# Patient Record
Sex: MALE | Race: BLACK OR AFRICAN AMERICAN | NOT HISPANIC OR LATINO | Employment: UNEMPLOYED | URBAN - METROPOLITAN AREA
[De-identification: names, ages, dates, MRNs, and addresses within clinical notes are randomized per-mention and may not be internally consistent; named-entity substitution may affect disease eponyms.]

---

## 2023-03-09 ENCOUNTER — HOSPITAL ENCOUNTER (EMERGENCY)
Facility: HOSPITAL | Age: 63
Discharge: HOME | End: 2023-03-10
Attending: EMERGENCY MEDICINE
Payer: COMMERCIAL

## 2023-03-09 DIAGNOSIS — M25.552 LEFT HIP PAIN: Primary | ICD-10-CM

## 2023-03-09 DIAGNOSIS — C79.51 BONE METASTASES: ICD-10-CM

## 2023-03-09 LAB
ALBUMIN SERPL BCP-MCNC: 3.5 G/DL (ref 3.5–5.2)
ALP SERPL-CCNC: 591 U/L (ref 55–135)
ALT SERPL W/O P-5'-P-CCNC: 60 U/L (ref 10–44)
ANION GAP SERPL CALC-SCNC: 10 MMOL/L (ref 8–16)
AST SERPL-CCNC: 32 U/L (ref 10–40)
BASOPHILS # BLD AUTO: 0.02 K/UL (ref 0–0.2)
BASOPHILS NFR BLD: 0.4 % (ref 0–1.9)
BILIRUB SERPL-MCNC: 0.3 MG/DL (ref 0.1–1)
BUN SERPL-MCNC: 22 MG/DL (ref 8–23)
CALCIUM SERPL-MCNC: 9.3 MG/DL (ref 8.7–10.5)
CHLORIDE SERPL-SCNC: 105 MMOL/L (ref 95–110)
CO2 SERPL-SCNC: 25 MMOL/L (ref 23–29)
CREAT SERPL-MCNC: 1.2 MG/DL (ref 0.5–1.4)
DIFFERENTIAL METHOD: ABNORMAL
EOSINOPHIL # BLD AUTO: 0.1 K/UL (ref 0–0.5)
EOSINOPHIL NFR BLD: 1.6 % (ref 0–8)
ERYTHROCYTE [DISTWIDTH] IN BLOOD BY AUTOMATED COUNT: 12.6 % (ref 11.5–14.5)
EST. GFR  (NO RACE VARIABLE): >60 ML/MIN/1.73 M^2
GLUCOSE SERPL-MCNC: 94 MG/DL (ref 70–110)
HCT VFR BLD AUTO: 34.4 % (ref 40–54)
HGB BLD-MCNC: 11.7 G/DL (ref 14–18)
IMM GRANULOCYTES # BLD AUTO: 0.03 K/UL (ref 0–0.04)
IMM GRANULOCYTES NFR BLD AUTO: 0.6 % (ref 0–0.5)
LYMPHOCYTES # BLD AUTO: 2.5 K/UL (ref 1–4.8)
LYMPHOCYTES NFR BLD: 51.1 % (ref 18–48)
MCH RBC QN AUTO: 30.4 PG (ref 27–31)
MCHC RBC AUTO-ENTMCNC: 34 G/DL (ref 32–36)
MCV RBC AUTO: 89 FL (ref 82–98)
MONOCYTES # BLD AUTO: 0.4 K/UL (ref 0.3–1)
MONOCYTES NFR BLD: 8.9 % (ref 4–15)
NEUTROPHILS # BLD AUTO: 1.9 K/UL (ref 1.8–7.7)
NEUTROPHILS NFR BLD: 37.4 % (ref 38–73)
NRBC BLD-RTO: 0 /100 WBC
PLATELET # BLD AUTO: 185 K/UL (ref 150–450)
PMV BLD AUTO: 10.3 FL (ref 9.2–12.9)
POTASSIUM SERPL-SCNC: 3.9 MMOL/L (ref 3.5–5.1)
PROT SERPL-MCNC: 7.5 G/DL (ref 6–8.4)
RBC # BLD AUTO: 3.85 M/UL (ref 4.6–6.2)
SODIUM SERPL-SCNC: 140 MMOL/L (ref 136–145)
WBC # BLD AUTO: 4.97 K/UL (ref 3.9–12.7)

## 2023-03-09 PROCEDURE — 84153 ASSAY OF PSA TOTAL: CPT | Performed by: EMERGENCY MEDICINE

## 2023-03-09 PROCEDURE — 85025 COMPLETE CBC W/AUTO DIFF WBC: CPT | Performed by: EMERGENCY MEDICINE

## 2023-03-09 PROCEDURE — 80053 COMPREHEN METABOLIC PANEL: CPT | Performed by: EMERGENCY MEDICINE

## 2023-03-09 PROCEDURE — 99285 EMERGENCY DEPT VISIT HI MDM: CPT

## 2023-03-09 RX ORDER — OXYCODONE HYDROCHLORIDE 5 MG/1
5 TABLET ORAL
Status: DISCONTINUED | OUTPATIENT
Start: 2023-03-09 | End: 2023-03-10

## 2023-03-10 VITALS
HEIGHT: 72 IN | HEART RATE: 60 BPM | OXYGEN SATURATION: 99 % | DIASTOLIC BLOOD PRESSURE: 57 MMHG | TEMPERATURE: 97 F | SYSTOLIC BLOOD PRESSURE: 102 MMHG | BODY MASS INDEX: 19.23 KG/M2 | RESPIRATION RATE: 20 BRPM | WEIGHT: 142 LBS

## 2023-03-10 LAB
PROSTATE SPECIFIC ANTIGEN, TOTAL: 580.8 NG/ML (ref 0–4)
PSA FREE MFR SERPL: ABNORMAL %
PSA FREE SERPL-MCNC: >20 NG/ML (ref 0–1.5)

## 2023-03-10 PROCEDURE — 63600175 PHARM REV CODE 636 W HCPCS: Performed by: EMERGENCY MEDICINE

## 2023-03-10 PROCEDURE — A9585 GADOBUTROL INJECTION: HCPCS | Performed by: EMERGENCY MEDICINE

## 2023-03-10 PROCEDURE — 25500020 PHARM REV CODE 255: Performed by: EMERGENCY MEDICINE

## 2023-03-10 RX ORDER — GADOBUTROL 604.72 MG/ML
7 INJECTION INTRAVENOUS
Status: COMPLETED | OUTPATIENT
Start: 2023-03-10 | End: 2023-03-10

## 2023-03-10 RX ORDER — DEXAMETHASONE SODIUM PHOSPHATE 4 MG/ML
8 INJECTION, SOLUTION INTRA-ARTICULAR; INTRALESIONAL; INTRAMUSCULAR; INTRAVENOUS; SOFT TISSUE
Status: DISCONTINUED | OUTPATIENT
Start: 2023-03-10 | End: 2023-03-10

## 2023-03-10 RX ORDER — OXYCODONE AND ACETAMINOPHEN 5; 325 MG/1; MG/1
1 TABLET ORAL EVERY 6 HOURS PRN
Qty: 10 TABLET | Refills: 0 | Status: SHIPPED | OUTPATIENT
Start: 2023-03-10

## 2023-03-10 RX ORDER — OXYCODONE AND ACETAMINOPHEN 5; 325 MG/1; MG/1
1 TABLET ORAL
Status: DISCONTINUED | OUTPATIENT
Start: 2023-03-10 | End: 2023-03-10

## 2023-03-10 RX ADMIN — GADOBUTROL 7 ML: 604.72 INJECTION INTRAVENOUS at 04:03

## 2023-03-10 NOTE — ED TRIAGE NOTES
Pt presents to Er with c/o left hip and shoulder pain for 1 week. Pt rates pain 2/10. Pt denies any other s/s at this time. Pt AAOx4. Pt has HX of Cancer,pt has weakness to legs and does not know when he might fall. Pt AAOx4.

## 2023-03-10 NOTE — ED NOTES
Received report from ALEJANDRA Winkler. Pt resting comfortably at this time. NADN. Vitals stable.

## 2023-03-10 NOTE — ED PROVIDER NOTES
"Encounter Date: 3/9/2023    SCRIBE #1 NOTE: I, Darrin Mckeon, am scribing for, and in the presence of,  Hu Goldstein MD. I have scribed the following portions of the note - Other sections scribed: HPI, ROS, PE.     History     Chief Complaint   Patient presents with    Hip Pain     Stage 4 prostates cancer with metastasis to many other sites, pain on rt hip(sharp pain) on and off X 1 week and a half     Princess Tanner Sr. is a 62 y.o. male, with a PMHx of Stage 4 Prostate Cancer, who presents to the ED with left hip pain onset a few days ago. Patient reports the left hip pain radiates into left ankle and is described as "excruciating" and sharp. Patient further notes pre diagnosed shoulder pain secondary to the hip pain. Patient states he's battling with Stage 4 Prostate Cancer that has spread to the liver, bladder, ribs, spine and shoulders. Patient further reports he is currently being treated at [facility] with concerns of Spinal Cord Compression. Patient endorses compliance with hormone therapy. Patient denies use of radiation or chemotherapy for treatment. Patient further denies recent falls or trauma. No other exacerbating or alleviating factors. Denies nausea, vomiting, weakness  or other associated symptoms.      The history is provided by the patient. No  was used.   Review of patient's allergies indicates:  No Known Allergies  Past Medical History:   Diagnosis Date    Cancer      No past surgical history on file.  History reviewed. No pertinent family history.     Review of Systems   Constitutional:  Negative for chills and fever.   HENT:  Negative for congestion.    Respiratory:  Negative for cough and shortness of breath.    Gastrointestinal:  Negative for abdominal pain, diarrhea and nausea.   Genitourinary:  Negative for dysuria.   Musculoskeletal:  Negative for back pain.        (+): Left Hip Pain, (+): Shoulder Pain   Skin:  Negative for rash.   Neurological:  " Negative for headaches.     Physical Exam     Initial Vitals [03/09/23 1958]   BP Pulse Resp Temp SpO2   112/66 68 18 98.6 °F (37 °C) 97 %      MAP       --         Physical Exam    Nursing note and vitals reviewed.  Constitutional: He appears well-developed. He is not diaphoretic. No distress.   HENT:   Head: Normocephalic.   Eyes: EOM are normal.   Cardiovascular:  Normal rate and regular rhythm.           No murmur heard.  Pulses:       Dorsalis pedis pulses are 2+ on the right side and 2+ on the left side.   5/5 leg extension (L and R)   Pulmonary/Chest: Effort normal and breath sounds normal. He has no wheezes.   Abdominal: Abdomen is soft. He exhibits no distension. There is no abdominal tenderness.   Musculoskeletal:         General: Normal range of motion.      Comments: Dorsi and Plantar intact   Leg (L): Protruding pain radiating to ankle     Neurological: He is alert.   Skin: Skin is warm.   No overlying skin changes, no edema        ED Course   Procedures  Labs Reviewed   CBC W/ AUTO DIFFERENTIAL - Abnormal; Notable for the following components:       Result Value    RBC 3.85 (*)     Hemoglobin 11.7 (*)     Hematocrit 34.4 (*)     Immature Granulocytes 0.6 (*)     Gran % 37.4 (*)     Lymph % 51.1 (*)     All other components within normal limits   COMPREHENSIVE METABOLIC PANEL - Abnormal; Notable for the following components:    Alkaline Phosphatase 591 (*)     ALT 60 (*)     All other components within normal limits   PSA, TOTAL AND FREE          Imaging Results              MRI Cervical Spine W WO Cont (Final result)  Result time 03/10/23 08:43:56      Final result by Alexey Mansfield MD (03/10/23 08:43:56)                   Impression:      1. Findings in keeping with osseous metastatic disease involving the cervical and visualized upper thoracic spine.  2. No evidence of pathologic fracture or extraosseous soft tissue mass at the present time.  3. Degenerative findings in the cervical spine, as  discussed.  4. No significant spinal canal stenosis.  Normal appearance of the cervical and visualized upper thoracic spinal cord.  5. Additional details as provided in the body of report.  Same day MRI of the thoracic and lumbar spine is reported separately.      Electronically signed by: Alexey Mansfield  Date:    03/10/2023  Time:    08:43               Narrative:    EXAMINATION:  MRI CERVICAL SPINE W WO CONTRAST    CLINICAL HISTORY:  Bone mass or bone pain, cervical spine, aggressive features on xray;    TECHNIQUE:  Multiplanar, multisequence MR imaging the cervical spine was performed without with contrast.  Post-contrast sequences performed following intravenous administration of 7 mL Gadavist contrast.    COMPARISON:  Correlation made with same day MR imaging of the thoracic and lumbar spine.    FINDINGS:  Comment: Motion compromised examination.    Alignment: There is no significant vertebral subluxation.    Developmental spinal canal narrowing: None.    Discs: Relatively modest degenerative disc disease.    Vertebrae: Correlating with findings on earlier same day MRI of the thoracic lumbar spine, there numerous enhancing T2/STIR hyperintense lesions throughout the cervical spine and visualized upper thoracic spine in keeping with metastatic disease, in light of patient history.    No definite evidence of pathologic fracture or extraosseous soft tissue mass the present time.    Spinal cord: The imaged cord has normal signal.    Posterior fossa: No posterior fossa abnormalities are identified.    Level-wise findings are as follows:    C2-C3: There is no significant spinal canal or foraminal stenosis.    C3-C4: Shallow disc osteophyte complex, uncinate hypertrophy, and mild facet arthropathy.  Mild ventral thecal sac deformed without significant canal stenosis.  Mild bilateral foraminal narrowing.    C4-C5: Shallow disc osteophyte complex with tiny central disc protrusion, uncinate hypertrophy, and facet  arthropathy.  Mild ventral thecal sac deformity without significant canal stenosis.  Mild-to-moderate right and minimal left foraminal narrowing.    C5-C6: Disc osteophyte complex, uncinate hypertrophy, facet arthropathy.  Mild ventral thecal sac deformed without significant canal stenosis.  Moderate right and mild-to-moderate left foraminal narrowing.    C6-C7: Shallow disc osteophyte complex minimally deforms ventral thecal sac.  Moderate bilateral foraminal narrowing.    C7-T1: There is no significant spinal canal or foraminal stenosis.    Additional comments: Flow voids of the vertebral arteries appear maintained.                                       MRI Thoracic Spine Without Contrast (Final result)  Result time 03/10/23 04:54:23      Final result by Vishnu Mendez MD (03/10/23 04:54:23)                   Impression:      Findings consistent with osseous metastatic disease.    There is no evidence for high-grade spinal canal stenosis.      Electronically signed by: Vishnu Mendez  Date:    03/10/2023  Time:    04:54               Narrative:    EXAMINATION:  MRI THORACIC SPINE WITHOUT CONTRAST    CLINICAL HISTORY:  Bone mass or bone pain, thoracic spine, aggressive features on xray;    TECHNIQUE:  Multiplanar, multisequence images were performed through the thoracic spine.  Contrast was not administered.  The lack of intravenous contrast diminishes the sensitivity for the detection of infectious, inflammatory, and neoplastic abnormalities.    COMPARISON:  MRI examination of the lumbar spine March 10, 2023, hip radiograph March 9, 2023    FINDINGS:  There is abnormal appearance throughout the visualized osseous structures consistent with bone row replacement process, consistent with osseous metastatic disease, correlating with the appearance on hip radiograph and lumbar spine MRI.    There is no evidence for high-grade or acute compression fracture deformity.  There is no evidence for high-grade  spondylolisthesis.    There is no evidence for intraspinal mass lesion.  There is no evidence for high-grade spinal canal stenosis or large focal disc protrusion.    The spinal cord appears normal in signal and caliber, without evidence for spinal cord edema or myelomalacia.                                       MRI Lumbar Spine Without Contrast (Final result)  Result time 03/10/23 04:47:58      Final result by Vishnu Mendez MD (03/10/23 04:47:58)                   Impression:      Abnormal appearance of the osseous structures consistent with osseous metastatic disease.    There is no evidence for high-grade spinal canal or foraminal stenosis, no evidence for large focal disc protrusion, no evidence for intraspinal mass lesion on this exam.      Electronically signed by: Vishnu Mendez  Date:    03/10/2023  Time:    04:47               Narrative:    EXAMINATION:  MRI LUMBAR SPINE WITHOUT CONTRAST    CLINICAL HISTORY:  Bone mass or bone pain, lumbar spine, aggressive features on xray;    TECHNIQUE:  Multiplanar, multisequence MR images were acquired from the thoracolumbar junction to the sacrum without the administration of contrast.  The lack of intravenous contrast diminishes sensitivity for detection of infectious, inflammatory and neoplastic abnormalities.    COMPARISON:  Radiograph hip with pelvis March 9, 2023    FINDINGS:  There are multiple areas of bone marrow replacement process throughout the visualized osseous structures consistent with osseous metastatic disease and correlating with the appearance on the recent hip/pelvic radiograph.    Vertebral body height and alignment appear well maintained there is no evidence for high-grade spondylolisthesis.  There is no evidence for high-grade or acute compression fracture deformity.  Intervertebral disc height and signal appears well maintained.    There is no evidence for high-grade spinal canal or foraminal stenosis, there is no evidence for large focal  disc protrusion.  There is no evidence for intraspinal mass lesion, or fluid collection.  The visualized distal spinal cord is appropriate signal and caliber and tapers appropriately.                                        X-Ray Hip 2 or 3 views Right (with Pelvis when performed) (Final result)  Result time 03/09/23 20:29:14      Final result by Ferny Calderon MD (03/09/23 20:29:14)                   Impression:      Findings concerning for osseous metastatic disease.  No acute displaced fracture seen.    This report was flagged in Epic as abnormal.      Electronically signed by: Ferny Calderon MD  Date:    03/09/2023  Time:    20:29               Narrative:    EXAMINATION:  XR HIP WITH PELVIS WHEN PERFORMED, 2 OR 3  VIEWS RIGHT    CLINICAL HISTORY:  Pain in right hip    TECHNIQUE:  AP view of the pelvis and frog leg lateral view of the right hip were performed.    COMPARISON:  None    FINDINGS:  Abnormal dense sclerotic appearance is seen of the right iliac bone.  Additional sclerotic foci and lesions are seen throughout the visualized osseous structures of the pelvis and proximal femurs.  This is concerning for osseous metastatic disease.  No prior history provided or prior studies available for comparison.    No acute displaced fracture seen.  No dislocation.                                       Medications   gadobutroL (GADAVIST) injection 7 mL (7 mLs Intravenous Given 3/10/23 0447)     Medical Decision Making:   History:   Old Medical Records: I decided to obtain old medical records.  Initial Assessment:     62-year-old male presenting for left hip pain.  Patient has known prostate cancer with metastasis to liver and bony metastasis.  Per the patient no known hip metastasis.  Patient denies any recent trauma.  Patient reports pain for multiple weeks has been intermittent.  Patient does report pain radiating into the left leg from the lower back at times.  Patient denies any weakness or falls.  On exam  patient has 4/5 strength of the left lower extremity.  Given his known metastasis to the lumbar region MRI of the spine obtained emergently for possible epidural mass.  MRI lumbar and thoracic spine show no spinal impingement.  Patient has known metastasis throughout the spine based on previous PET scan.  Given patient has not had any trauma and is capable of walking on the leg recommendation is for outpatient MRI for the hip.  I also discussed with the patient possibly starting chemotherapy as patient is attempting holistic in hormone therapy only.  Patient would likely benefit from rediscussion with his oncologist due to worsening metastasis.  Differential Diagnosis:     Differential for the hip pain includes metastatic bony lesion pain, pelvic fracture.    Differential for the leg weakness includes cauda equina versus spinal mass  Clinical Tests:   Lab Tests: Ordered and Reviewed  Radiological Study: Ordered and Reviewed  ED Management:    Problems considered includes leg pain (Signs & symptoms, differentials)  Chronic problems impacting care includes prostate cancer with metastasis.  Imaging independently reviewed.  Agree with radiologist's interpretation. Imaging includes multiple bony lesions noted in the pelvis  All labs reviewed and considered in the patient's differential diagnosis. Discussion of labs includes no pneumo or renal failure.    Plan was discussed with the patient.  This includes including labs imaging, plan for follow up with the Oncology, appropriate use of narcotics, return precautions, avoiding any trauma due to bony Mets.    All questions or concerns have been addressed.          Scribe Attestation:   Scribe #1: I performed the above scribed service and the documentation accurately describes the services I performed. I attest to the accuracy of the note.                   Clinical Impression:   Final diagnoses:  [M25.552] Left hip pain (Primary)  [C79.51] Bone metastases       Hu HOPE  Angelita, personally performed the services described in this documentation. All medical record entries made by the scribe were at my direction and in my presence. I have reviewed the chart and agree that the record reflects my personal performance and is accurate and complete.    ED Disposition Condition    Discharge Stable          ED Prescriptions       Medication Sig Dispense Start Date End Date Auth. Provider    oxyCODONE-acetaminophen (PERCOCET) 5-325 mg per tablet Take 1 tablet by mouth every 6 (six) hours as needed for Pain. 10 tablet 3/10/2023 -- Hu Goldstein MD          Follow-up Information       Follow up With Specialties Details Why Contact Info    [facility]  Schedule an appointment as soon as possible for a visit in 3 days Primary care [address] [facility] [id number]    [facility] - Emergency Dept Emergency Medicine  If symptoms worsen [address] [facility] [address]  [phone]             Hu Goldstein MD  03/10/23 0637       Hu Peterson MD  03/10/23 0928

## 2023-03-10 NOTE — ED NOTES
Pt reports he takes Tamsulosin 0.4mg nightly and sometimes more than once a day if needed. Pt reports he felt like he was straining while urinating  and would like to take his at home med. Primary nurse informed.

## 2023-03-10 NOTE — DISCHARGE INSTRUCTIONS
Follow up with the primary care provider or oncologist to discuss for possible further imaging including MRI of the left hip.  Recommend Tylenol as needed for pain control of the hip pain.